# Patient Record
Sex: MALE | Race: WHITE | NOT HISPANIC OR LATINO | Employment: FULL TIME | URBAN - METROPOLITAN AREA
[De-identification: names, ages, dates, MRNs, and addresses within clinical notes are randomized per-mention and may not be internally consistent; named-entity substitution may affect disease eponyms.]

---

## 2020-01-06 ENCOUNTER — HOSPITAL ENCOUNTER (OUTPATIENT)
Dept: RADIOLOGY | Facility: MEDICAL CENTER | Age: 75
End: 2020-01-06
Attending: GENERAL PRACTICE
Payer: MEDICARE

## 2020-01-06 DIAGNOSIS — R10.10 UPPER ABDOMINAL PAIN: ICD-10-CM

## 2020-01-06 PROCEDURE — 76700 US EXAM ABDOM COMPLETE: CPT

## 2020-08-21 ENCOUNTER — TELEPHONE (OUTPATIENT)
Dept: CARDIOLOGY | Facility: MEDICAL CENTER | Age: 75
End: 2020-08-21

## 2020-08-21 NOTE — TELEPHONE ENCOUNTER
LVM for patient about upcoming appointment with  8-. Asked patient if he has seen a cardiologist before and if so where he was seen.

## 2020-08-26 ENCOUNTER — OFFICE VISIT (OUTPATIENT)
Dept: CARDIOLOGY | Facility: MEDICAL CENTER | Age: 75
End: 2020-08-26
Payer: MEDICARE

## 2020-08-26 VITALS
OXYGEN SATURATION: 95 % | HEIGHT: 75 IN | WEIGHT: 219 LBS | BODY MASS INDEX: 27.23 KG/M2 | HEART RATE: 98 BPM | SYSTOLIC BLOOD PRESSURE: 122 MMHG | DIASTOLIC BLOOD PRESSURE: 70 MMHG

## 2020-08-26 DIAGNOSIS — R73.9 HYPERGLYCEMIA: ICD-10-CM

## 2020-08-26 DIAGNOSIS — I10 ESSENTIAL HYPERTENSION: ICD-10-CM

## 2020-08-26 PROCEDURE — 93000 ELECTROCARDIOGRAM COMPLETE: CPT | Performed by: INTERNAL MEDICINE

## 2020-08-26 PROCEDURE — 99204 OFFICE O/P NEW MOD 45 MIN: CPT | Performed by: INTERNAL MEDICINE

## 2020-08-26 RX ORDER — SILDENAFIL 100 MG/1
TABLET, FILM COATED ORAL
COMMUNITY
Start: 2020-07-28

## 2020-08-26 RX ORDER — FLUTICASONE PROPIONATE 50 MCG
SPRAY, SUSPENSION (ML) NASAL
COMMUNITY
Start: 2020-07-06

## 2020-08-26 RX ORDER — ATORVASTATIN CALCIUM 40 MG/1
TABLET, FILM COATED ORAL
COMMUNITY
Start: 2020-08-24

## 2020-08-26 NOTE — PROGRESS NOTES
Arrhythmia/Cardiac Clinic Note (New patient)     DOS: 8/26/2020    Referring physician: Dr Redding    Chief complaint/Reason for consult: I was told to come see a cardiologist    HPI: 74-year-old man with history of hypertension, hyperlipidemia, stroke, diabetes, presenting for evaluation.  He feels really well, no issues.  No shortness of breath or chest pain.  No palpitations.  No syncope.  He really has no complaints today.  His primary care doctor sent him to be evaluated by cardiologist to see if he needs an echocardiogram or stress test.  There is also a question of if he should continue taking Plavix, which was started because of history of stroke.  He does say he has having some leg cramping mostly at night when he is resting.    ROS (+ highlighted in bold):  Constitutional: Fevers/chills/fatigue/weightloss  HEENT: Blurry vision/eye pain/sore throat/hearing loss  Respiratory: Shortness of breath/cough  Cardiovascular: Chest pain/palpitations/edema/orthopnea/syncope  GI: Nausea/vomitting/diarrhea  MSK: Arthralgias/myagias/muscle weakness  Skin: Rash/sores  Neurological: Numbness/tremors/vertigo  Endocrine: Excessive thirst/polyuria/cold intolerance/heat intolerance  Psych: Depression/anxiety    Past Medical History:   Diagnosis Date   • Cold 11-14-15   • Dental disorder     upper   • Diabetes (HCC)    • Hypertension    • Stroke (HCC) 4-2012       Past Surgical History:   Procedure Laterality Date   • CHOLECYSTECTOMY ROBOTIC SINGLE SITE N/A 12/18/2015    Procedure: CHOLECYSTECTOMY ROBOTIC SINGLE SITE with idocyanine dye prior;  Surgeon: Beto Myers M.D.;  Location: SURGERY [facility];  Service:    • OTHER      kidney stone- laser litho       Social History     Socioeconomic History   • Marital status: Single     Spouse name: Not on file   • Number of children: Not on file   • Years of education: Not on file   • Highest education level: Not on file   Occupational History   • Not on file   Social Needs    • Financial resource strain: Not on file   • Food insecurity     Worry: Not on file     Inability: Not on file   • Transportation needs     Medical: Not on file     Non-medical: Not on file   Tobacco Use   • Smoking status: Never Smoker   • Smokeless tobacco: Never Used   Substance and Sexual Activity   • Alcohol use: No   • Drug use: No   • Sexual activity: Not on file   Lifestyle   • Physical activity     Days per week: Not on file     Minutes per session: Not on file   • Stress: Not on file   Relationships   • Social connections     Talks on phone: Not on file     Gets together: Not on file     Attends [employer] service: Not on file     Active member of club or organization: Not on file     Attends meetings of clubs or organizations: Not on file     Relationship status: Not on file   • Intimate partner violence     Fear of current or ex partner: Not on file     Emotionally abused: Not on file     Physically abused: Not on file     Forced sexual activity: Not on file   Other Topics Concern   • Not on file   Social History Narrative   • Not on file       No family history on file.    No Known Allergies    Current Outpatient Medications   Medication Sig Dispense Refill   • atorvastatin (LIPITOR) 40 MG Tab      • fluticasone (FLONASE) 50 MCG/ACT nasal spray      • metformin (GLUCOPHAGE) 1000 MG tablet      • linagliptin (TRADJENTA) 5 MG Tab tablet Take 5 mg by mouth every day.     • glipiZIDE (GLUCOTROL) 5 MG Tab Take 5 mg by mouth 2 times a day.     • clopidogrel (PLAVIX) 75 MG Tab Take 75 mg by mouth every day.     • lisinopril (PRINIVIL, ZESTRIL) 40 MG tablet Take 40 mg by mouth every day.     • sildenafil citrate (VIAGRA) 50 MG tablet Take 50 mg by mouth as needed for Erectile Dysfunction.     • metFORMIN (GLUCOPHAGE) 500 MG Tab      • sildenafil citrate (VIAGRA) 100 MG tablet TAKE 1 TABLET BY MOUTH ONCE DAILY AS NEEDED APPROXIMATELY 1 HOUR BEFORE SEXUAL ACTIVITY     • RUTIN PO Take 500 mg by mouth every day.    "  • felodipine (PLENDIL) 5 MG TABLET SR 24 HR Take 5 mg by mouth every day.     • Multiple Vitamin (MULTI-VITAMIN PO) Take  by mouth every day.     • CRANBERRY PO Take 4,200 mg by mouth every day.     • Cyanocobalamin (VITAMIN B-12 CR) 1500 MCG Tab CR Take  by mouth every day.     • Garlic 1000 MG Cap Take  by mouth every day.     • aspirin EC (ECOTRIN) 81 MG Tablet Delayed Response Take 81 mg by mouth every day.     • metformin (GLUCOPHAGE) 850 MG Tab Take 850 mg by mouth 2 times a day, with meals.       No current facility-administered medications for this visit.        Physical Exam:  Vitals:    08/26/20 1538   BP: 122/70   BP Location: Left arm   Patient Position: Sitting   BP Cuff Size: Large adult   Pulse: 98   SpO2: 95%   Weight: 99.3 kg (219 lb)   Height: 1.905 m (6' 3\")     General appearance: NAD, conversant   Eyes: anicteric sclerae, moist conjunctivae; no lid-lag; PERRLA  HENT: Atraumatic; oropharynx clear with moist mucous membranes and no mucosal ulcerations; normal hard and soft palate  Neck: Trachea midline; FROM, supple, no thyromegaly or lymphadenopathy  Lungs: CTA, with normal respiratory effort and no intercostal retractions  CV: RRR, no MRGs, no JVD   Abdomen: Soft, non-tender; no masses or HSM  Extremities: No peripheral edema or extremity lymphadenopathy  Skin: Normal temperature, turgor and texture; no rash, ulcers or subcutaneous nodules  Psych: Appropriate affect, alert and oriented to person, place and time    Data:  Lipids:   Lab Results   Component Value Date/Time    CHOLSTRLTOT 223 (H) 10/01/2015 11:59 AM    TRIGLYCERIDE 242 (H) 10/01/2015 11:59 AM    HDL 46 10/01/2015 11:59 AM     (H) 10/01/2015 11:59 AM        BMP:  Lab Results   Component Value Date/Time    SODIUM 134 (L) 12/17/2015 1405    POTASSIUM 3.9 12/17/2015 1405    CHLORIDE 100 12/17/2015 1405    CO2 24 12/17/2015 1405    GLUCOSE 220 (H) 12/17/2015 1405    BUN 14 12/17/2015 1405    CREATININE 0.85 12/17/2015 1405    " CALCIUM 9.5 12/17/2015 1405    ANION 10.0 12/17/2015 1405        TSH:   Lab Results   Component Value Date/Time    TSHULTRASEN 1.320 10/01/2015 1159        THYROXINE (T4):   No results found for: CASSIEIR     CBC:   Lab Results   Component Value Date/Time    WBC 7.1 12/17/2015 02:05 PM    RBC 5.25 12/17/2015 02:05 PM    HEMOGLOBIN 14.5 12/17/2015 02:05 PM    HEMATOCRIT 45.6 12/17/2015 02:05 PM    MCV 86.9 12/17/2015 02:05 PM    MCH 27.6 12/17/2015 02:05 PM    MCHC 31.8 (L) 12/17/2015 02:05 PM    RDW 43.1 12/17/2015 02:05 PM    PLATELETCT 240 12/17/2015 02:05 PM    MPV 10.8 12/17/2015 02:05 PM    NEUTSPOLYS 57.50 10/01/2015 11:59 AM    LYMPHOCYTES 34.30 10/01/2015 11:59 AM    MONOCYTES 6.70 10/01/2015 11:59 AM    EOSINOPHILS 1.00 10/01/2015 11:59 AM    BASOPHILS 0.10 10/01/2015 11:59 AM    IMMGRAN 0.40 10/01/2015 11:59 AM    NRBC 0.00 10/01/2015 11:59 AM    NEUTS 3.82 10/01/2015 11:59 AM    LYMPHS 2.29 10/01/2015 11:59 AM    MONOS 0.45 10/01/2015 11:59 AM    EOS 0.07 10/01/2015 11:59 AM    BASO 0.01 10/01/2015 11:59 AM    IMMGRANAB 0.03 10/01/2015 11:59 AM    NRBCAB 0.00 10/01/2015 11:59 AM        CBC w/o DIFF  Lab Results   Component Value Date/Time    WBC 7.1 12/17/2015 02:05 PM    RBC 5.25 12/17/2015 02:05 PM    HEMOGLOBIN 14.5 12/17/2015 02:05 PM    MCV 86.9 12/17/2015 02:05 PM    MCH 27.6 12/17/2015 02:05 PM    MCHC 31.8 (L) 12/17/2015 02:05 PM    RDW 43.1 12/17/2015 02:05 PM    MPV 10.8 12/17/2015 02:05 PM       EKG interpreted by me: Normal EKG normal sinus rhythm    Impression/Plan:  1.  Hypertension  2.  Hyperlipidemia  3.  History of stroke    -Blood pressure is well managed, continue current therapy  -He is having myalgias, probably from the atorvastatin.  I am not privy to his most recent cholesterol panel.  I do think it would be reasonable to decrease the dose of atorvastatin, if his LDL is below goal.  -Regarding history of stroke, the details of this event are unclear, he was started on Plavix and is  doing well on this, I would seek neurology opinion if this should be continued, change to aspirin, or discontinued.  -Seeing as he has no angina, no shortness of breath, no physical exam findings which are abnormal, and normal EKG, further cardiac testing is not necessary.    Follow up with primary care    Rafy Sanchez MD  Cardiac Electrophysiology

## 2020-08-28 LAB — EKG IMPRESSION: NORMAL

## 2020-12-22 ENCOUNTER — OFFICE VISIT (OUTPATIENT)
Dept: NEUROLOGY | Facility: MEDICAL CENTER | Age: 75
End: 2020-12-22
Attending: PSYCHIATRY & NEUROLOGY
Payer: MEDICARE

## 2020-12-22 VITALS
WEIGHT: 222 LBS | DIASTOLIC BLOOD PRESSURE: 94 MMHG | BODY MASS INDEX: 27.6 KG/M2 | SYSTOLIC BLOOD PRESSURE: 158 MMHG | HEIGHT: 75 IN | OXYGEN SATURATION: 98 % | HEART RATE: 84 BPM | TEMPERATURE: 97.4 F

## 2020-12-22 DIAGNOSIS — I63.119 CEREBROVASCULAR ACCIDENT (CVA) DUE TO EMBOLISM OF VERTEBRAL ARTERY, UNSPECIFIED BLOOD VESSEL LATERALITY (HCC): ICD-10-CM

## 2020-12-22 PROBLEM — E78.5 HLD (HYPERLIPIDEMIA): Status: ACTIVE | Noted: 2020-12-22

## 2020-12-22 PROBLEM — I10 HTN (HYPERTENSION): Status: ACTIVE | Noted: 2020-12-22

## 2020-12-22 PROBLEM — E11.9 TYPE 2 DIABETES MELLITUS (HCC): Status: ACTIVE | Noted: 2020-12-22

## 2020-12-22 PROBLEM — I63.9 STROKE (HCC): Status: ACTIVE | Noted: 2020-12-22

## 2020-12-22 PROCEDURE — 99204 OFFICE O/P NEW MOD 45 MIN: CPT | Performed by: PSYCHIATRY & NEUROLOGY

## 2020-12-22 PROCEDURE — 99211 OFF/OP EST MAY X REQ PHY/QHP: CPT | Performed by: PSYCHIATRY & NEUROLOGY

## 2020-12-22 ASSESSMENT — PATIENT HEALTH QUESTIONNAIRE - PHQ9: CLINICAL INTERPRETATION OF PHQ2 SCORE: 0

## 2020-12-22 NOTE — PROGRESS NOTES
"[facility] NEUROLOGY  NEW PATIENT VISIT    Referral source: David Redding MD    CC: stroke    HISTORY OF ILLNESS:  Lyndon Mane is a 75 y.o. man with a history most notable for HTN, HLD, DM, and stroke.  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  He went to bed.  That morning he awoke with a \"massive headache.\"  When he got up he immediately fell to the floor.  He crawled out to the front room where he called EMS.  He was taken to [facility].  He vomited several times.  He was told that a \"blood vessel broke in the back of his neck going to the brain\" and that the vessel had to \"heal itself.\"  After a 4-day inpatient stay he was discharged to [place] rehab.  He spent about 1 month in rehab.  He continued to have frequent vomiting.  He lost weight.  He used a walker and then became well enough to go home.    Lyndon was placed on clopidogrel.  He was not instructed to take both ASA and clopidogrel every day.  He began taking low-dose aspirin on his own.    Lyndon has not had any additional episodes of neurologic symptoms since that time.  Today, he feels well.    MEDICAL AND SURGICAL HISTORY:  Past Medical History:   Diagnosis Date   • Cold 11-14-15   • Dental disorder     upper   • Diabetes (HCC)    • Hypertension    • Stroke (HCC) 4-2012     Past Surgical History:   Procedure Laterality Date   • CHOLECYSTECTOMY ROBOTIC SINGLE SITE N/A 12/18/2015    Procedure: CHOLECYSTECTOMY ROBOTIC SINGLE SITE with idocyanine dye prior;  Surgeon: Beto Myers M.D.;  Location: SURGERY [facility];  Service:    • OTHER      kidney stone- laser litho     MEDICATIONS:  Current Outpatient Medications   Medication Sig   • atorvastatin (LIPITOR) 40 MG Tab    • metformin (GLUCOPHAGE) 1000 MG tablet    • metFORMIN (GLUCOPHAGE) 500 MG Tab    • sildenafil citrate (VIAGRA) 100 MG tablet TAKE 1 TABLET BY MOUTH ONCE " DAILY AS NEEDED APPROXIMATELY 1 HOUR BEFORE SEXUAL ACTIVITY   • RUTIN PO Take 500 mg by mouth every day.   • linagliptin (TRADJENTA) 5 MG Tab tablet Take 5 mg by mouth every day.   • glipiZIDE (GLUCOTROL) 5 MG Tab Take 5 mg by mouth 2 times a day.   • clopidogrel (PLAVIX) 75 MG Tab Take 75 mg by mouth every day.   • felodipine (PLENDIL) 5 MG TABLET SR 24 HR Take 5 mg by mouth every day.   • lisinopril (PRINIVIL, ZESTRIL) 40 MG tablet Take 40 mg by mouth every day.   • Multiple Vitamin (MULTI-VITAMIN PO) Take  by mouth every day.   • CRANBERRY PO Take 4,200 mg by mouth every day.   • Cyanocobalamin (VITAMIN B-12 CR) 1500 MCG Tab CR Take  by mouth every day.   • Garlic 1000 MG Cap Take  by mouth every day.   • sildenafil citrate (VIAGRA) 50 MG tablet Take 50 mg by mouth as needed for Erectile Dysfunction.   • aspirin EC (ECOTRIN) 81 MG Tablet Delayed Response Take 81 mg by mouth every day.   • fluticasone (FLONASE) 50 MCG/ACT nasal spray      SOCIAL HISTORY:  Social History     Tobacco Use   • Smoking status: Never Smoker   • Smokeless tobacco: Never Used   Substance Use Topics   • Alcohol use: No     Social History     Social History Narrative   • Not on file     FAMILY HISTORY:  History reviewed. No pertinent family history.  REVIEW OF SYSTEMS:  A ROS was completed.  Pertinent positives and negatives were included in the HPI, above.  All other systems were reviewed and are negative.    PHYSICAL EXAM:  General/Medical:  - NAD  - hair, skin, nails, and joints were normal  - heart rate and rhythm were regular    Neuro:  MENTAL STATUS: awake and alert; no deficits of speech or language; oriented to person, place, and time; affect was appropriate to situation; pleasant, cooperative    CRANIAL NERVES:    II: acuity was: J5/J2; fields intact to confrontation; pupils 3/3 to 2/2 without a relative afferent pupillary defect; discs sharp    III/IV/VI: versions intact without nystagmus    V: facial sensation symmetric to light  touch    VII: facial expression symmetric    VIII: hearing intact to finger rub    IX/X: palate elevates symmetrically    XI: shoulder shrug symmetric    XII: tongue midline    MOTOR:  - bulk was normal  - paratonia in the upper extremities, bilaterally  Upper Extremity Strength  (R/L)    5/5   Elbow flexion 5/5   Elbow extension 5/5   Shoulder abduction 5/5     Lower Extremity Strength  (R/L)   Hip flexion 5/5   Knee extension 5/5   Knee flexion 5/5   Ankle plantarflexion 5/5   Ankle dorsiflexion 5/5     - can walk on toes and heels  - no pronator drift; no abnormal movements    SENSATION:  - light touch: grossly intact over the upper and lower extremities  - vibration (R/L, seconds): NT at the great toes  - pinprick: NT  - proprioception: NT  - Romberg: absent    COORDINATION:  - finger to nose was normal  - heel to shin was normal  - finger tapping was rapid and accurate, bilaterally    REFLEXES:  Reflex Right Left   BR 2+ 2+   Biceps 2+ 2+   Triceps 2+ 2+   Patellae 2+ 2+   Achilles NT NT   Toes NT NT     GAIT:  - narrow base and normal  - heel-raised and toe-raised gait: intact  - tandem gait: some difficulty    REVIEW OF IMAGING STUDIES:  No CNS images available for review.    REVIEW OF LABORATORY STUDIES:  No recent data available.    ASSESSMENT:  Lyndon Mane is a 75 y.o. man with a history of stoke with risk factors including HTN, HLD, and DM.  Lyndon was apparently referred to neurology in order to determine whether or not he requires treatment with clopidogrel.  Unfortunately, I cannot make a recommendation at this time since I didn't have access to Lyndon's medical records or imaging studies.  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  In this is correct, then antiplatelet therapy (as opposed to dual antiplatelet therapy or anticoagulation) is indicated.  I will ask my staff to obtain outside records.  I instructed  Lyndon to continue his current medication regimen and reach out to the clinic in 2 weeks if he has not heard from our office with a recommendation by that time.    PLAN:  Stroke:  - obtain outside records    Follow-Up:  - No follow-ups on file.    Signed: Bean Cota M.D. at 7:07 AM on 12/22/20

## 2021-01-15 DIAGNOSIS — Z23 NEED FOR VACCINATION: ICD-10-CM

## 2021-08-05 ENCOUNTER — PATIENT OUTREACH (OUTPATIENT)
Dept: HEALTH INFORMATION MANAGEMENT | Facility: OTHER | Age: 76
End: 2021-08-05

## 2021-08-05 NOTE — NON-PROVIDER
Outcome: Left Message to schedule comprehensive Health Assessment.     Please transfer to Patient Outreach Team at [phone] when patient returns call.    [facility] Verified: yes    Attempt # 1

## 2021-10-01 ENCOUNTER — TELEPHONE (OUTPATIENT)
Dept: HEALTH INFORMATION MANAGEMENT | Facility: OTHER | Age: 76
End: 2021-10-01

## 2021-10-01 NOTE — TELEPHONE ENCOUNTER
Outbound call to patient to offer FIT TEST. LVM regarding a Preventive Screening, requesting a call back to [phone].

## 2021-11-19 NOTE — NON-PROVIDER
Outcome: Left Message    Please transfer to Patient Outreach Team at [phone] when patient returns call.        [facility] Verified: yes    Attempt # 2

## 2021-12-29 ENCOUNTER — HOSPITAL ENCOUNTER (OUTPATIENT)
Facility: MEDICAL CENTER | Age: 76
End: 2021-12-29
Payer: MEDICARE

## 2021-12-29 PROCEDURE — 82274 ASSAY TEST FOR BLOOD FECAL: CPT

## 2022-01-13 LAB — IMM ASSAY OCC BLD FITOB: NEGATIVE

## 2022-01-27 ENCOUNTER — TELEPHONE (OUTPATIENT)
Dept: HEALTH INFORMATION MANAGEMENT | Facility: OTHER | Age: 77
End: 2022-01-27

## 2023-10-03 ENCOUNTER — TELEPHONE (OUTPATIENT)
Dept: HEALTH INFORMATION MANAGEMENT | Facility: OTHER | Age: 78
End: 2023-10-03
Payer: MEDICARE

## 2024-12-20 ENCOUNTER — TELEPHONE (OUTPATIENT)
Dept: HEALTH INFORMATION MANAGEMENT | Facility: OTHER | Age: 79
End: 2024-12-20
Payer: MEDICARE